# Patient Record
Sex: MALE | URBAN - METROPOLITAN AREA
[De-identification: names, ages, dates, MRNs, and addresses within clinical notes are randomized per-mention and may not be internally consistent; named-entity substitution may affect disease eponyms.]

---
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
## 2023-04-05 ENCOUNTER — HOSPITAL ENCOUNTER (OUTPATIENT)
Age: 18
End: 2023-04-05
Attending: EMERGENCY MEDICINE

## 2023-04-05 PROCEDURE — 99283 EMERGENCY DEPT VISIT LOW MDM: CPT

## 2023-04-05 PROCEDURE — 74011250637 HC RX REV CODE- 250/637: Performed by: EMERGENCY MEDICINE

## 2023-04-05 PROCEDURE — 74011250636 HC RX REV CODE- 250/636: Performed by: EMERGENCY MEDICINE

## 2023-04-05 RX ORDER — ONDANSETRON 4 MG/1
4 TABLET, ORALLY DISINTEGRATING ORAL
Qty: 20 TABLET | Refills: 0 | Status: SHIPPED
Start: 2023-04-05

## 2023-04-05 RX ORDER — ONDANSETRON 4 MG/1
8 TABLET, ORALLY DISINTEGRATING ORAL
Status: COMPLETED
Start: 2023-04-05 | End: 2023-04-05

## 2023-04-05 RX ORDER — HYOSCYAMINE SULFATE 0.12 MG/1
0.12 TABLET SUBLINGUAL
Status: COMPLETED
Start: 2023-04-05 | End: 2023-04-05

## 2023-04-05 RX ADMIN — ONDANSETRON 8 MG: 4 TABLET, ORALLY DISINTEGRATING ORAL at 12:54

## 2023-04-05 RX ADMIN — HYOSCYAMINE SULFATE 0.12 MG: 0.12 TABLET SUBLINGUAL at 13:10

## 2023-04-05 NOTE — Clinical Note
[facility] EMERGENCY DEPT  [address] [facility] [address] [phone]    Work/School Note    Date: 4/5/2023    To Whom It May concern:    Kelly Robin was seen and treated today in the emergency room by the following provider(s):  Attending Provider: Shannan Duarte MD.      Kelly Robin is excused from work/school on 04/05/23 and 04/06/23. He is medically clear to return to work/school on 4/7/2023.        Sincerely,          Lionel Olivera MD

## 2023-04-05 NOTE — ED NOTES
Patient (s)  given copy of dc instructions and  script(s). Patient (s)  verbalized understanding of instructions and script (s). Patient given a current medication reconciliation form and verbalized understanding of their medications. Patient (s) verbalized understanding of the importance of discussing medications with  his or her physician or clinic they will be following up with self. Patient alert and oriented and in no acute distress. Patient discharged home ambulatory with .

## 2023-04-05 NOTE — ED PROVIDER NOTES
[facility] EMERGENCY DEPT  EMERGENCY DEPARTMENT ENCOUNTER       Pt Name: Renata Coleman  MRN: [id number]  [place] 2005  Date of evaluation: 4/5/2023  Provider: Berenice Degroot MD   PCP: Goldy Hernandez MD  Note Started: 12:37 PM 4/5/23     CHIEF COMPLAINT       Chief Complaint   Patient presents with    Vomiting        HISTORY OF PRESENT ILLNESS: 1 or more elements      History From: patient, History limited by: none     Renata Coleman is a 21 y.o. male presents to the emergency department with 1 day of vomiting. Previously healthy 78-year-old male presents emerged department with 1 day of vomiting. Patient reports he ate breakfast yesterday and went to work. He reports he had an episode of emesis. This is associate with some upper abdominal \"cramping. \"  And nausea. Denies any diarrhea. No known sick contacts. Patient denies fever but does report chills. Denies chest pain or shortness of breath. No urinary symptoms. Patient reports his boss told him that he \"needs a doctor's note\" to return to work. Please See MDM for Additional Details of the HPI/PMH  Nursing Notes were all reviewed and agreed with or any disagreements were addressed in the HPI. REVIEW OF SYSTEMS        Positives and Pertinent negatives as per HPI. PAST HISTORY     Past Medical History:  Past Medical History:   Diagnosis Date    Anxiety        Past Surgical History:  History reviewed. No pertinent surgical history. Family History:  History reviewed. No pertinent family history. Social History:  Social History     Tobacco Use    Smoking status: Some Days     Types: Cigarettes    Smokeless tobacco: Never    Tobacco comments:     1 black & mild occ   Substance Use Topics    Alcohol use: Not Currently    Drug use: Never       Allergies:   Allergies   Allergen Reactions    Cheese Other (comments)     Vomiting, \"get weak\"    Milk Other (comments)     Eye swelling    Yogurt Plus Calcium Gummies [Calcium Phosphate-Vitamin D3] Other (comments)     vomiting       CURRENT MEDICATIONS      Discharge Medication List as of 4/5/2023  1:11 PM          SCREENINGS               No data recorded         PHYSICAL EXAM      ED Triage Vitals [04/05/23 1227]   ED Encounter Vitals Group      /64      Pulse (Heart Rate) 89      Resp Rate 18      Temp 98.1 °F (36.7 °C)      Temp src       O2 Sat (%) 100 %      Weight 140 lb      Height 6' 2\"        Physical Exam  Vitals and nursing note reviewed. Constitutional:       Comments: 20-year-old male, resting on chair, no acute distress   HENT:      Head: Normocephalic and atraumatic. Cardiovascular:      Rate and Rhythm: Normal rate and regular rhythm. Pulmonary:      Effort: Pulmonary effort is normal.      Breath sounds: Normal breath sounds. Abdominal:      General: Abdomen is flat. Tenderness: There is no abdominal tenderness. There is no guarding or rebound. Musculoskeletal:         General: No swelling. Normal range of motion. Skin:     General: Skin is warm. Neurological:      General: No focal deficit present. Mental Status: He is alert. Psychiatric:         Mood and Affect: Mood normal.        DIAGNOSTIC RESULTS   LABS:     No results found for this or any previous visit (from the past 12 hour(s)). EKG: If performed, independent interpretation documented below in the MDM section     RADIOLOGY:  Non-plain film images such as CT, Ultrasound and MRI are read by the radiologist. Plain radiographic images are visualized and preliminarily interpreted by the ED Provider with the findings documented in the MDM section. Interpretation per the Radiologist below, if available at the time of this note:     No results found.       PROCEDURES   Unless otherwise noted below, none  Procedures     CRITICAL CARE TIME   0    EMERGENCY DEPARTMENT COURSE and DIFFERENTIAL DIAGNOSIS/MDM   Vitals:    Vitals:    04/05/23 1227   BP: 108/64   Pulse: 89   Resp: 18   Temp: 98.1 °F (36.7 °C) SpO2: 100%   Weight: 63.5 kg (140 lb)   Height: 6' 2\" (1.88 m)        Patient was given the following medications:  Medications   hyoscyamine SL (LEVSIN/SL) tablet 0.125 mg (0.125 mg SubLINGual Given 4/5/23 1310)   ondansetron (ZOFRAN ODT) tablet 8 mg (8 mg Oral Given 4/5/23 1254)       Medical Decision Making  20-year-old male presents emerged department chief complaint of vomiting. Does report some nausea and epigastric pain in the ED. His abdominal exam is benign and nonfocal.  At this time, exam is most suspicious for infectious gastroenteritis, gastritis, peptic ulcer disease. Doubt appendicitis given location of patient's pain. Additionally given no right upper quadrant tenderness, doubt biliary colic. Has normal vitals and reassuring exam.  Considered labs but deferring at this time. Will give Levsin and Zofran p.o. Will observe and plan serial abdominal exams and discharge from the emergency department symptoms improved. Risk  Prescription drug management. ED Course as of 04/05/23 1420   Wed Apr 05, 2023   1308 Reassessed, improved nausea after Zofran. A repeat abdominal exam is benign. Will give p.o. on discharge. [MB]      ED Course User Index  [MB] Waqar Long MD         FINAL IMPRESSION     1. Acute vomiting    2. Abdominal pain, epigastric          DISPOSITION/PLAN   Alen Olea's  results have been reviewed with him. He has been counseled regarding his diagnosis, treatment, and plan. He verbally conveys understanding and agreement of the signs, symptoms, diagnosis, treatment and prognosis and additionally agrees to follow up as discussed. He also agrees with the care-plan and conveys that all of his questions have been answered.   I have also provided discharge instructions for him that include: educational information regarding their diagnosis and treatment, and list of reasons why they would want to return to the ED prior to their follow-up appointment, should his condition change. CLINICAL IMPRESSION    Discharged       PATIENT REFERRED TO:  Follow-up Information       Follow up With Specialties Details Why [address] [facility] EMERGENCY DEPT Emergency Medicine  If symptoms worsen [address]              DISCHARGE MEDICATIONS:  Discharge Medication List as of 4/5/2023  1:11 PM            DISCONTINUED MEDICATIONS:  Discharge Medication List as of 4/5/2023  1:11 PM          I am the Primary Clinician of Record. Tash Toledo MD (electronically signed)    (Please note that parts of this dictation were completed with voice recognition software. Quite often unanticipated grammatical, syntax, homophones, and other interpretive errors are inadvertently transcribed by the computer software. Please disregards these errors.  Please excuse any errors that have escaped final proofreading.)

## 2023-04-05 NOTE — ED NOTES
Pt presents to ED with c/o nausea & vomiting since 4/2, denies diarrhea. Pt also reports having some chills earlier today. Pt denies abd pain, fever, cough. Assessment complete, pt updated on plan of care, call light within reach. Emergency Department Nursing Plan of Care       The Nursing Plan of Care is developed from the Nursing assessment and Emergency Department Attending provider initial evaluation. The plan of care may be reviewed in the ED Provider note.     The Plan of Care was developed with the following considerations:   Patient / Family readiness to learn indicated by:verbalized understanding  Persons(s) to be included in education: patient  Barriers to Learning/Limitations:No    Signed     Deanna Onofre RN    4/5/2023   1:00 PM

## 2023-04-05 NOTE — DISCHARGE INSTRUCTIONS
Please use the zofran to help with nausea and vomiting. Drink plenty of fluids to stay hydrated. Return for new or worsening symptoms at any time as we are always happy to reevaluate you.